# Patient Record
(demographics unavailable — no encounter records)

---

## 2025-02-28 NOTE — ASSESSMENT
[FreeTextEntry1] : SLIME has vesicoureteral reflux into the upper and lower poles of the duplicated left collecting systems and is doing well clinically thus far on prophylactic antibiotics and serial imaging. Most recent VCUG (2/26/2025) demonstrated left grade 2 VUR in the upper pole and grade 3 in the lower pole, stable from previous VCUG (2/6/24). Mom instructed to call office back with updated weight after PCP [facility] in 2 days. Prophylaxis to continue. The next visit in 6 months will be for RBUS for hydronephrosis surveillance. Mother expressed understanding of the plan and all questions were answered.

## 2025-02-28 NOTE — CONSULT LETTER
[FreeTextEntry1] : Dear Dr. Kirk,   I had the pleasure of seeing Lee for follow up today. Below is my note regarding the office visit today.    Thank you so very much for allowing me to participate in Lee's care. Please don't hesitate to call me should any questions or issues arise .    Sincerely,    Benigno Bobby MD, FACS, [facility]    Chief, Pediatric Urology    Professor of Urology and Pediatrics    [employer]    President, [employer] - [place] Section    Past-President, Societies [employer] Pediatric U[employer]

## 2025-02-28 NOTE — REASON FOR VISIT
[Home] : at home, [unfilled] , at the time of the visit. [Medical Office: ([facility])___] : at the medical office located in  [Telehealth (audio & video)] : This visit was provided via telehealth using real-time 2-way audio visual technology. [Follow-Up Visit] : a follow-up visit [Hydronephrosis] : hydronephrosis [VUR] : vesicoureteral reflux [PCP] : ~pcp~ [Mother] : mother [FreeTextEntry3] : mother

## 2025-02-28 NOTE — DATA REVIEWED
[FreeTextEntry1] : ACC: [id number]     EXAM:  US ABD TARGET DYN INIT LES   ORDERED BY: BOY CAVANAUGH  PROCEDURE DATE:  02/26/2025  INTERPRETATION:  EXAMINATION: Ultrasound voiding cystourethrogram  HISTORY: Congenital hydronephrosis  COMPARISON: Ultrasound VCUG 2/6/2024  TECHNIQUE: Using sterile technique an 8 [place] catheter was inserted into the urinary bladder.  Using ultrasound guidance 1 cc of Lumason Microbubbles were mixed with 500 cc of Saline, which was subsequently instilled into the bladder via gravity. A single filling/voiding cycle was accomplished.  FINDINGS:  Urinary bladder appeared unremarkable. Duplicated left collecting system is again noted. With late filling there was reflux into a nondilated upper pole moiety and mildly dilated lower pole moiety. There is no vesicoureteral reflux on the right. The urethra was normal in appearance.  IMPRESSION:  Duplicated left collecting system with grade 2 reflux into the upper pole moiety and grade 3 reflux into the lower pole moiety  --- End of Report ---  KORTNEY SORENSEN MD; Attending Radiologist This document has been electronically signed. Feb 26 2025 11:18AM

## 2025-02-28 NOTE — HISTORY OF PRESENT ILLNESS
[TextBox_4] :  I verified the identity of the patient and the reason for the appointment with the parent. I explained to the parent that telemedicine encounters are not the same as a direct patient/healthcare provider visit because the patient and healthcare provider are not in the same room, which can result in limitations, including with the physical examination. I explained that the telemedicine encounter may require the patients genitalia to be shown. I explained that after the telemedicine encounter, the patient may require an office visit for an in-person physical examination, ultrasound, or other testing. I informed the parent that there may be privacy risks associated with the use of the technology and that there may be costs associated with the encounter. I offered the option of an office visit rather than a telemedicine encounter. Parent stated that all explanations were understood, and that all questions were answered to their satisfaction. The parent verbalized their preference and consent to proceed with the telemedicine encounter.  Lee is here for follow up of hydronephrosis. Initial in office ultrasounds (12/15/23) demonstrated right grade 1 and duplicated left system with grade 1 hydronephrosis in both upper and lower pole. USVCUG (2/6/24) demonstrated reflux into the upper (grade 2) and lower (grade 3) poles of the duplicated left collecting system. RBUS (8/14/2024) demonstrated bilateral grade 1 hydronephrosis. Had VCUG on (2/26/25). He returns today to review those results. Since the last visit, he has been well without any UTIs, unexplained fevers, voiding complaints, issues feeding.